# Patient Record
Sex: MALE | Race: ASIAN | NOT HISPANIC OR LATINO | URBAN - METROPOLITAN AREA
[De-identification: names, ages, dates, MRNs, and addresses within clinical notes are randomized per-mention and may not be internally consistent; named-entity substitution may affect disease eponyms.]

---

## 2022-04-27 ENCOUNTER — EMERGENCY (EMERGENCY)
Facility: HOSPITAL | Age: 22
LOS: 1 days | Discharge: HOME | End: 2022-04-27
Attending: EMERGENCY MEDICINE
Payer: MEDICAID

## 2022-04-27 VITALS
HEART RATE: 78 BPM | SYSTOLIC BLOOD PRESSURE: 104 MMHG | DIASTOLIC BLOOD PRESSURE: 69 MMHG | OXYGEN SATURATION: 99 % | TEMPERATURE: 98 F | RESPIRATION RATE: 18 BRPM

## 2022-04-27 VITALS
HEIGHT: 63 IN | RESPIRATION RATE: 19 BRPM | SYSTOLIC BLOOD PRESSURE: 116 MMHG | OXYGEN SATURATION: 100 % | HEART RATE: 103 BPM | DIASTOLIC BLOOD PRESSURE: 69 MMHG | WEIGHT: 130.07 LBS | TEMPERATURE: 98 F

## 2022-04-27 PROCEDURE — 99283 EMERGENCY DEPT VISIT LOW MDM: CPT

## 2022-04-27 PROCEDURE — 73110 X-RAY EXAM OF WRIST: CPT | Mod: 26,RT

## 2022-04-27 PROCEDURE — 73110 X-RAY EXAM OF WRIST: CPT

## 2022-04-27 PROCEDURE — 99283 EMERGENCY DEPT VISIT LOW MDM: CPT | Mod: 25

## 2022-04-27 RX ORDER — ACETAMINOPHEN 500 MG
975 TABLET ORAL ONCE
Refills: 0 | Status: COMPLETED | OUTPATIENT
Start: 2022-04-27 | End: 2022-04-27

## 2022-04-27 RX ORDER — IBUPROFEN 200 MG
600 TABLET ORAL ONCE
Refills: 0 | Status: COMPLETED | OUTPATIENT
Start: 2022-04-27 | End: 2022-04-27

## 2022-04-27 RX ADMIN — Medication 600 MILLIGRAM(S): at 12:10

## 2022-04-27 RX ADMIN — Medication 975 MILLIGRAM(S): at 12:10

## 2022-04-27 NOTE — ED ADULT NURSE NOTE - OBJECTIVE STATEMENT
22 year old male no PMHx presents to the ED ambulatory through waiting room complaining of atraumatic R Wrist pain x 3 days. Pt. denies taking anything for pain prior to arrival to ED. Denies falls/trauma/known injury to wrist. On assessment, no deformity or edema noted. Pt. well appearing in no acute distress. Plan to obtain xray imaging of R wrist discussed and pt. verbalizes understanding and acceptance with plan of care.

## 2022-04-27 NOTE — ED PROVIDER NOTE - PATIENT PORTAL LINK FT
You can access the FollowMyHealth Patient Portal offered by [facility] by registering at the following website: [email or web address]. By joining [employer]’s FollowMyHealth portal, you will also be able to view your health information using other applications (apps) compatible with our system.

## 2022-04-27 NOTE — ED PROVIDER NOTE - NSFOLLOWUPCLINICS_GEN_ALL_ED_FT
[facility]  Orthopedic Surgery  [address], 3rd & 4th [place] [address]  Phone: [phone]  Fax:     [facility]  Orthopedic Surgery  [address] [address]  Phone: [phone]  Fax:

## 2022-04-27 NOTE — ED PROVIDER NOTE - CLINICAL SUMMARY MEDICAL DECISION MAKING FREE TEXT BOX
21 yo M pw wrist pain. Will obtain XR to eval for fracture vs scapholunate dissociation. No erythema or swelling so no concern for gout or septic arthritis. Will give Tylenol and Motrin for pain. 23 yo M pw wrist pain. Will obtain XR to eval for fracture vs scapholunate dissociation. No erythema or swelling so no concern for gout or septic arthritis. Will give Tylenol and Motrin for pain.     Attn - R wrist pain - no concern for fx or scaphoid - xray, analgesia 21 yo M pw wrist pain. Consistent with soft tissue injury. Will obtain XR to eval for fracture vs scapholunate dissociation. No erythema or swelling so no concern for gout or septic arthritis. Will give Tylenol and Motrin for pain.     Attn - R wrist pain - no concern for fx or scaphoid - xray, analgesia

## 2022-04-27 NOTE — ED PROVIDER NOTE - PHYSICAL EXAMINATION
GENERAL: AAOx4, GCS 15, NAD, WDWN;   HEENT: MMM,  EOMI, nonicteric sclera;   PULM: CTA B, no crackles/rubs/rales;   CV: RRR, S1S2, no MRG;   ABD: Flat abdomen, NTND, no R/G/R, no CVAT.    MSK: HARRISON, +2 pulses x4;  No wrist deformity or effusion noted bilaterally, TTP over listers tubercle and scapholunate ligament, 5/5  strength, flexion and extension of both wrists, no snuffbox, scaphoid tubercle tenderness, or pain with axial loading of the thumb.   NEURO: No obvious focal deficits; Sensation intact to gross touch in median, ulnar, and radial distributions of both hands   SKIN: No erythema or ecchymosis   PSYCH: AAOx3, clear thought and normal sensorium. GENERAL: AAOx4, GCS 15, NAD, WDWN;   HEENT: MMM,  EOMI, nonicteric sclera;   PULM: CTA B, no crackles/rubs/rales;   CV: RRR, S1S2, no MRG;   ABD: Flat abdomen, NTND, no R/G/R, no CVAT.    MSK: HARRISON, +2 pulses x4;  No wrist deformity or effusion noted bilaterally, TTP over listers tubercle and scapholunate ligament, 5/5  strength, flexion and extension of both wrists, no snuffbox, scaphoid tubercle tenderness, or pain with axial loading of the thumb.   NEURO: No obvious focal deficits; Sensation intact to gross touch in median, ulnar, and radial distributions of both hands   SKIN: No erythema or ecchymosis   PSYCH: AAOx3, clear thought and normal sensorium.     Attn - alert, nad, R wrist - no swelling or deformity.  tender dorsum of wrist with poss small ganglion. no scaphoid, TFCC or volar tenderness.  increased pain with wrist flexion and extreme wrist extension.  NV intact. skin - NL.

## 2022-04-27 NOTE — ED PROVIDER NOTE - OBJECTIVE STATEMENT
23 yo M who is R hand dominant presenting with R wrist pain. He endorses tenderness over the dorsal surface of his wrist that began a few days ago. He denies any trauma to the area. He states it has hurt previously when he feel on the wrist over a year ago. He states it was swollen at that time. He never saw a doctor after his prior injury. Endorses pain worse with movement. Denies numbness, tingling, fever, chills. 23 yo M who is R hand dominant presenting with R wrist pain. He endorses tenderness over the dorsal surface of his wrist that began a few days ago. He denies any trauma to the area. He states it has hurt previously when he feel on the wrist over a year ago. He states it was swollen at that time. He never saw a doctor after his prior injury. Endorses pain worse with movement. Denies numbness, tingling, fever, chills.      Attn - pt seen in [facility] - agree with above -  injury one yr ago and c/o recurrent R wrist pain  last few days. no recurrent injury. no numbness or weakness. pt is RHD.  pain worse with wrist flexion.

## 2022-04-27 NOTE — ED PROVIDER NOTE - NSFOLLOWUPINSTRUCTIONS_ED_ALL_ED_FT
You were evaluated in the Emergency Department for wrist pain.  You were evaluated and examined by a physician, and you had an x-ray of your wrist which did not show any broken bones.    Based on your evaluation: Ganglion cyst     There are no signs of emergency conditions requiring admission to the hospital on today's workup.      We recommend that you:  1. See your Hand Doctor within the next 1-2 weeks for follow up.  Bring a copy of your discharge paperwork (including any test results) to your doctor.  2. Please take Tylenol 1000mg every 6 hours as needed for pain.  3. Please take Ibuprofen 600mg every 6 hours as needed for pain.    ???? ?????? ??????? ????:  1. ??????? ???? ??????? 1-2 ???????? ????? ????? ????? ????????? ???? ???? ????? ????? ??????? ?????????? ???? ??????? (?????? ???????? ????? ??) ????? ????????? ???? ?????  2. ?????? ???? ???????? ???????? ????? 6 ????? ?? ?? Tylenol 1000mg ????  3. ?????? ???? ??????? ??? ????? 6 ????? ????? Ibuprofen 600mg ????    Rosa Isela fonseca:  1. Phalo'apera jan'ya parabarti 1-2 saptahera madhye apanara hatera ?aktarera sathe dekha laureen apanara srabera kagajapatrera eka?i anulipi (yekono parik?cachorro phalaphala clifton) apanara ?aktarera kache anuna.  2. Byathara kael'ya pra?ojana anuya?i prati 6 ghan?a para para Tylenol 1000mg manju.  3. Byvaleriecachorro kael'ya anugraha anni prati 6 ghan?a antara Ibuprofen 600mg manju.      Return immediately if you have worsening symptoms, fever, swelling, numbness, tingling, vomiting, or any other new/concerning symptoms.     ????? ??????????? ??????, ????, ???????, ???????, ??????, ??? ?? ???? ???? ????/????????? ?????? ????? ???????? ???? ?????    Apanara carmen torressarga, jbara, pholabhaba, asa?shay, ?inli?, bami ba an'ya betho natuna/samparkita upasarga enriquekale dmitrylampaola phire asuna.

## 2023-02-22 ENCOUNTER — EMERGENCY (EMERGENCY)
Facility: HOSPITAL | Age: 23
LOS: 1 days | Discharge: HOME | End: 2023-02-22
Attending: EMERGENCY MEDICINE
Payer: MEDICAID

## 2023-02-22 VITALS
RESPIRATION RATE: 16 BRPM | SYSTOLIC BLOOD PRESSURE: 92 MMHG | TEMPERATURE: 98 F | DIASTOLIC BLOOD PRESSURE: 57 MMHG | HEIGHT: 67 IN | HEART RATE: 73 BPM | WEIGHT: 154.98 LBS | OXYGEN SATURATION: 99 %

## 2023-02-22 VITALS
RESPIRATION RATE: 18 BRPM | SYSTOLIC BLOOD PRESSURE: 101 MMHG | DIASTOLIC BLOOD PRESSURE: 62 MMHG | HEART RATE: 82 BPM | OXYGEN SATURATION: 100 % | TEMPERATURE: 98 F

## 2023-02-22 PROBLEM — Z78.9 OTHER SPECIFIED HEALTH STATUS: Chronic | Status: ACTIVE | Noted: 2022-04-27

## 2023-02-22 LAB
APPEARANCE UR: CLEAR — SIGNIFICANT CHANGE UP
BACTERIA # UR AUTO: NEGATIVE — SIGNIFICANT CHANGE UP
BILIRUB UR-MCNC: NEGATIVE — SIGNIFICANT CHANGE UP
COLOR SPEC: SIGNIFICANT CHANGE UP
DIFF PNL FLD: NEGATIVE — SIGNIFICANT CHANGE UP
EPI CELLS # UR: 0 /HPF — SIGNIFICANT CHANGE UP
GLUCOSE UR QL: NEGATIVE — SIGNIFICANT CHANGE UP
KETONES UR-MCNC: NEGATIVE — SIGNIFICANT CHANGE UP
LEUKOCYTE ESTERASE UR-ACNC: NEGATIVE — SIGNIFICANT CHANGE UP
NITRITE UR-MCNC: NEGATIVE — SIGNIFICANT CHANGE UP
PH UR: 6 — SIGNIFICANT CHANGE UP (ref 5–8)
PROT UR-MCNC: NEGATIVE — SIGNIFICANT CHANGE UP
RBC CASTS # UR COMP ASSIST: 0 /HPF — SIGNIFICANT CHANGE UP (ref 0–4)
SP GR SPEC: 1.01 — SIGNIFICANT CHANGE UP (ref 1.01–1.02)
UROBILINOGEN FLD QL: NEGATIVE — SIGNIFICANT CHANGE UP
WBC UR QL: 0 /HPF — SIGNIFICANT CHANGE UP (ref 0–5)

## 2023-02-22 PROCEDURE — 99283 EMERGENCY DEPT VISIT LOW MDM: CPT | Mod: 25

## 2023-02-22 PROCEDURE — 87086 URINE CULTURE/COLONY COUNT: CPT

## 2023-02-22 PROCEDURE — 81001 URINALYSIS AUTO W/SCOPE: CPT

## 2023-02-22 PROCEDURE — 99284 EMERGENCY DEPT VISIT MOD MDM: CPT

## 2023-02-22 PROCEDURE — 71046 X-RAY EXAM CHEST 2 VIEWS: CPT | Mod: 26

## 2023-02-22 PROCEDURE — 71046 X-RAY EXAM CHEST 2 VIEWS: CPT

## 2023-02-22 RX ORDER — IBUPROFEN 200 MG
600 TABLET ORAL ONCE
Refills: 0 | Status: COMPLETED | OUTPATIENT
Start: 2023-02-22 | End: 2023-02-22

## 2023-02-22 RX ORDER — METHOCARBAMOL 500 MG/1
1500 TABLET, FILM COATED ORAL ONCE
Refills: 0 | Status: COMPLETED | OUTPATIENT
Start: 2023-02-22 | End: 2023-02-22

## 2023-02-22 RX ORDER — METHOCARBAMOL 500 MG/1
2 TABLET, FILM COATED ORAL
Qty: 24 | Refills: 0
Start: 2023-02-22 | End: 2023-02-25

## 2023-02-22 RX ORDER — LIDOCAINE 4 G/100G
1 CREAM TOPICAL ONCE
Refills: 0 | Status: COMPLETED | OUTPATIENT
Start: 2023-02-22 | End: 2023-02-22

## 2023-02-22 RX ORDER — ACETAMINOPHEN 500 MG
650 TABLET ORAL ONCE
Refills: 0 | Status: COMPLETED | OUTPATIENT
Start: 2023-02-22 | End: 2023-02-22

## 2023-02-22 RX ADMIN — LIDOCAINE 1 PATCH: 4 CREAM TOPICAL at 17:34

## 2023-02-22 RX ADMIN — Medication 650 MILLIGRAM(S): at 17:35

## 2023-02-22 RX ADMIN — Medication 600 MILLIGRAM(S): at 17:35

## 2023-02-22 RX ADMIN — METHOCARBAMOL 1500 MILLIGRAM(S): 500 TABLET, FILM COATED ORAL at 18:36

## 2023-02-22 NOTE — ED PROVIDER NOTE - ATTENDING APP SHARED VISIT CONTRIBUTION OF CARE
Attending MD Solis:   I personally have seen and examined this patient.  Physician assistant note reviewed and agree on plan of care and except where noted.  See below for details.     seen in [place], accompanied by brother, magdiel Mathis     22M with no reported contributory PMH/PSH/Meds, no known drug allergies presents to the ED with R sided back pain.  Reports that he was working out his back at the gym today and when arrived home he felt sudden R sided back pain.  Reports pain is localized to R sided back, denies radiation, denies waxing and waning.  Reports constant, same intensity.  Denies numbness, weakness or tingling in extremities. Denies loss of urinary or bowel continence. Denies chest pain, shortness of breath, abdominal pain, nausea, vomiting, diarrhea. Denies dysuria, hematuria, change in urinary habits including frequency, urgency, fevers, chills.     Exam:   General: NAD  HENT: head NCAT, airway patent  Eyes: anicteric, no conjunctival injection   Lungs: lungs CTAB with good inspiratory effort, no wheezing, no rhonchi, no rales  Cardiac: +S1S2, no obvious m/r/g  GI: abdomen soft with +BS, NT, ND  : no CVAT  MSK: FROM at neck, +R paraspinal thoracic tenderness, no overlying skin changes, no midline tenderness  Neuro: moving all extremities spontaneously, nonfocal, ambulating with steady gait  Psych: normal mood and affect     A/P: 22M with R sided back pain after work out, suspect MSK, will evaluate for but not limited to right lower lobe pulmonary pathology such as PNA, nephrolithiasis, will obtain urine looking for RBCs but history and clinical picture not consistent with nephrolithiasis at this time, history and clinical picture not consistent with biliary pathology at this time, will give pain medication for MSK and reassess.

## 2023-02-22 NOTE — ED PROVIDER NOTE - OBJECTIVE STATEMENT
23 y/o male with no significant PMHx presents to the ED complaining of right sided back pain x 1 day. Patient states that he went to the gym today and did a back workout. Denies any inciting injury. States that he did exercises that he normally does. Upon arrival at home reported sudden onset of severe right sided back pain. Patient did not take anything for pain. States that he has never felt pain like this before. States that pain worsens with deep inspiration and movement. Has been constant since onset. Denies any headache, fever, chills, chest pain, sob, n/v/d.

## 2023-02-22 NOTE — ED PROVIDER NOTE - PROGRESS NOTE DETAILS
Attending MD Solis: Patient re-evaluated and feeling improved.  No acute issues at  this time.  Lab and radiology tests reviewed with patient and brother  Patient stable for discharge.  Discussed return to Emergency Department precautions, verbalized understanding. Follow up instructions given, importance of follow up emphasized, return to ED parameters reviewed and patient verbalized understanding.  All questions answered, all concerns addressed.

## 2023-02-22 NOTE — ED PROVIDER NOTE - NSFOLLOWUPINSTRUCTIONS_ED_ALL_ED_FT
1. Follow up with your PCP within 2-3 days.   2. Rest. Use ice/heat.   3. Take Ibuprofen (i.e. Motrin, Advil) 600mg every 8 hrs for pain as needed. Take with food.   May alternate with Acetminophen (Tylenol) 650mg every 6 hours for pain as needed.  4. Take robaxin 1500 mg every 8 hours as needed. THis will make you drowsy. Do NOT drive.   5. Refrain from physical activity.   6. Return to the emergency department if you develop worsening pain, dizziness, fevers, vomiting, or any other concerning symptoms.

## 2023-02-22 NOTE — ED PROVIDER NOTE - NS ED ATTENDING STATEMENT MOD
This was a shared visit with the ABBY. I reviewed and verified the documentation and independently performed the documented:

## 2023-02-22 NOTE — ED PROVIDER NOTE - PHYSICAL EXAMINATION
CONSTITUTIONAL: Patient is awake, alert and oriented x 3. Patient is well appearing and in no acute distress.  HEAD: NCAT  ENT: Airway patent, Nasal mucosa clear.   NECK: Supple,  LUNGS: CTA B/L,   HEART: RRR.+S1S2  ABDOMEN: Soft, non-tender to palpation throughout all four quadrants,     MSK: (+) right sided paraspinal thoracic ttp, no midline spine/neck ttp; FROM upper and lower ext b/l,   SKIN: No rash or lesions  NEURO:  No focal deficits,

## 2023-02-22 NOTE — ED ADULT NURSE NOTE - OBJECTIVE STATEMENT
9617 pt 22ym aox4 c/o back pain after lifting wts went to gym today,pt vss no acute distress able to verbalize concerns, family at bedside/

## 2023-02-22 NOTE — ED PROVIDER NOTE - PATIENT PORTAL LINK FT
You can access the FollowMyHealth Patient Portal offered by [facility] by registering at the following website: [email or web address]. By joining [employer]’s FollowMyHealth portal, you will also be able to view your health information using other applications (apps) compatible with our system.

## 2023-02-23 LAB
CULTURE RESULTS: NO GROWTH — SIGNIFICANT CHANGE UP
SPECIMEN SOURCE: SIGNIFICANT CHANGE UP

## 2024-10-24 ENCOUNTER — EMERGENCY (EMERGENCY)
Facility: HOSPITAL | Age: 24
LOS: 1 days | Discharge: HOME | End: 2024-10-24
Admitting: EMERGENCY MEDICINE
Payer: COMMERCIAL

## 2024-10-24 VITALS
WEIGHT: 130.07 LBS | TEMPERATURE: 98 F | RESPIRATION RATE: 18 BRPM | SYSTOLIC BLOOD PRESSURE: 96 MMHG | HEIGHT: 63 IN | OXYGEN SATURATION: 100 % | DIASTOLIC BLOOD PRESSURE: 58 MMHG | HEART RATE: 99 BPM

## 2024-10-24 VITALS
DIASTOLIC BLOOD PRESSURE: 64 MMHG | SYSTOLIC BLOOD PRESSURE: 102 MMHG | RESPIRATION RATE: 17 BRPM | TEMPERATURE: 98 F | OXYGEN SATURATION: 98 % | HEART RATE: 60 BPM

## 2024-10-24 PROCEDURE — 99284 EMERGENCY DEPT VISIT MOD MDM: CPT

## 2024-10-24 RX ORDER — CYCLOBENZAPRINE HCL 10 MG
1 TABLET ORAL
Qty: 21 | Refills: 0
Start: 2024-10-24 | End: 2024-10-30

## 2024-10-24 RX ORDER — LIDOCAINE 50 MG/G
1 CREAM TOPICAL ONCE
Refills: 0 | Status: COMPLETED | OUTPATIENT
Start: 2024-10-24 | End: 2024-10-24

## 2024-10-24 RX ADMIN — LIDOCAINE 1 PATCH: 50 CREAM TOPICAL at 15:52

## 2024-10-24 RX ADMIN — Medication 600 MILLIGRAM(S): at 15:52

## 2024-10-24 NOTE — ED PROVIDER NOTE - PROGRESS NOTE DETAILS
MARLYN Pro: case endorsed to reassess. pt states he feels better. neuro non-focal. stable for dc home. ortho follow up. strict return precautions.

## 2024-10-24 NOTE — ED PROVIDER NOTE - OBJECTIVE STATEMENT
25 yo M with no PMH, presents to ED c/o left lower back pain x1 week after heavy lifting. Reports lifting heavy box at work, about 30lbs, felt some pain after putting the box down, gradually got worse, non-radiating, worse with prolong standing. Admits taking Advil w/o much improvement. Denies hx of malignancy, unexplained weight loss, fever, rigors, malaise, recent infection, hx of IVDU, saddle anesthesia/perianal sensory loss, decreased rectal tone, urinary retention, inability to control urine from overflow, weakness, numbness, recent travel, or any other complaints.

## 2024-10-24 NOTE — ED PROVIDER NOTE - CLINICAL SUMMARY MEDICAL DECISION MAKING FREE TEXT BOX
25 yo M with no PMH, presents to ED c/o left lower back pain x1 week after heavy lifting. well appearing male. afebrile. no red flags. pt is ambulatory. Impression: back strain. Ddx: disc herniation. Plan: pain control, d/c with Flexeril and orthopedic follow up.

## 2024-10-24 NOTE — ED PROVIDER NOTE - PATIENT PORTAL LINK FT
You can access the FollowMyHealth Patient Portal offered by [facility] by registering at the following website: [email or web address]. By joining [employer]’s FollowMyHealth portal, you will also be able to view your health information using other applications (apps) compatible with our system.

## 2024-10-24 NOTE — ED ADULT NURSE NOTE - OBJECTIVE STATEMENT
Pt. presents to wellness c/o low back pain. Pt. states he picked up something heavy at work last week and hurt his back since. Pt. denies PMHx. Denies dysuria fever/chills abd pain n/v/d. medicated per order. pending discharge

## 2024-10-25 PROBLEM — Z00.00 ENCOUNTER FOR PREVENTIVE HEALTH EXAMINATION: Status: ACTIVE | Noted: 2024-10-25

## 2024-10-28 ENCOUNTER — APPOINTMENT (OUTPATIENT)
Dept: ORTHOPEDIC SURGERY | Facility: CLINIC | Age: 24
End: 2024-10-28
Payer: COMMERCIAL

## 2024-10-28 VITALS
RESPIRATION RATE: 16 BRPM | DIASTOLIC BLOOD PRESSURE: 67 MMHG | WEIGHT: 130 LBS | SYSTOLIC BLOOD PRESSURE: 101 MMHG | BODY MASS INDEX: 23.04 KG/M2 | HEIGHT: 63 IN | OXYGEN SATURATION: 96 % | HEART RATE: 73 BPM

## 2024-10-28 DIAGNOSIS — M54.9 DORSALGIA, UNSPECIFIED: ICD-10-CM

## 2024-10-28 PROCEDURE — 72100 X-RAY EXAM L-S SPINE 2/3 VWS: CPT

## 2024-10-28 PROCEDURE — 99204 OFFICE O/P NEW MOD 45 MIN: CPT | Mod: 25

## 2024-10-28 RX ORDER — TIZANIDINE 2 MG/1
2 TABLET ORAL EVERY 6 HOURS
Qty: 24 | Refills: 0 | Status: ACTIVE | COMMUNITY
Start: 2024-10-28 | End: 1900-01-01

## 2024-10-28 RX ORDER — MELOXICAM 15 MG/1
15 TABLET ORAL DAILY
Qty: 25 | Refills: 0 | Status: ACTIVE | COMMUNITY
Start: 2024-10-28 | End: 1900-01-01

## 2024-12-09 ENCOUNTER — APPOINTMENT (OUTPATIENT)
Dept: ORTHOPEDIC SURGERY | Facility: CLINIC | Age: 24
End: 2024-12-09

## 2024-12-28 NOTE — ED ADULT NURSE NOTE - NS_SISCREENINGSR_GEN_ALL_ED
Negative I have personally evaluated and examined the patient. The Attending was available to me as a supervising provider if needed.